# Patient Record
Sex: MALE | Race: WHITE | Employment: STUDENT | URBAN - METROPOLITAN AREA
[De-identification: names, ages, dates, MRNs, and addresses within clinical notes are randomized per-mention and may not be internally consistent; named-entity substitution may affect disease eponyms.]

---
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
## 2022-12-22 NOTE — CONSULTS
Dental Service Consultation        Lukas Gillespie MRN# [id number]   YOB: 2000 Age: 18 year old   Date of Admission: 10/2/2019     Reason for consult: I was asked by Dr. Alvarez to evaluate this patient for dry socket of wisdom tooth extraction site.           Assessment and Plan:   Assessment: Patient's extraction sites are healing normally and require no intervention at this time.    Plan: Recommended patient continue ibuprofen regimen at home and rinse w/ warm salt water twice a day.             Chief Complaint:   Patient is concerned that he may be getting a dry socket on the lower right and is concerned about the appearance of the site.    History is obtained from the patient         History of Present Illness:   This patient is a 18 year old male who presents to [facility] ED  with 3/10 pain of the lower right and concern that there may be swelling.               Past Medical History:   History reviewed. No pertinent past medical history.          Past Surgical History:   History reviewed. No pertinent surgical history.            Social History:     Social History     Tobacco Use     Smoking status: Not on file     Smokeless tobacco: Never Used   Substance Use Topics     Alcohol use: Not on file             Family History:   No family history on file.          Immunizations:     There is no immunization history on file for this patient.          Allergies:   No Known Allergies          Medications:     No current Epic-ordered facility-administered medications on file.      No current Epic-ordered outpatient medications on file.             Review of Systems:   The 10 point Review of Systems is negative other than noted in the HPI            Physical Exam:   Vitals were reviewed  Temp: 98.1  F (36.7  C) Temp src: Oral BP: 113/53 Pulse: 68   Resp: 18 SpO2: 100 % O2 Device: None (Room air)      Head and neck exam: Extraoral exam is negative for swelling, erythema, lymphadenopathy, or tenderness to  palpation. Inferior border of the mandible is palpable bilaterally.     Intraoral exam: Lower right surgical site appears to be healing normally. Sutures are still in place and sites are pink with no erythema or intraoral swelling. No bleeding or purulence present.        Data:   Radiographic interpretation: No radiograph ordered. Not necessary for evaluation.     The patient was discussed with: Dr. Sylvester Guerrero DDS  PGY1  Pager: [phone]   Size Of Lesion In Cm (Optional): 0 Instructions (Optional): [facility] Detailed Introduction Text (Please End With A Colon): The following procedure was deferred: Scheduling Instructions (Optional): [facility]HS SX FOR 12/29 @ [facility]SON[facility]